# Patient Record
Sex: MALE
[De-identification: names, ages, dates, MRNs, and addresses within clinical notes are randomized per-mention and may not be internally consistent; named-entity substitution may affect disease eponyms.]

---

## 2019-09-17 ENCOUNTER — HOSPITAL ENCOUNTER (OUTPATIENT)
Dept: HOSPITAL 92 - SDC | Age: 79
Discharge: HOME | End: 2019-09-17
Attending: INTERNAL MEDICINE
Payer: MEDICARE

## 2019-09-17 VITALS — BODY MASS INDEX: 31.1 KG/M2

## 2019-09-17 DIAGNOSIS — Z88.8: ICD-10-CM

## 2019-09-17 DIAGNOSIS — G47.30: ICD-10-CM

## 2019-09-17 DIAGNOSIS — K44.9: ICD-10-CM

## 2019-09-17 DIAGNOSIS — Z79.899: ICD-10-CM

## 2019-09-17 DIAGNOSIS — F32.9: ICD-10-CM

## 2019-09-17 DIAGNOSIS — K22.2: Primary | ICD-10-CM

## 2019-09-17 DIAGNOSIS — K21.9: ICD-10-CM

## 2019-09-17 PROCEDURE — 0D758ZZ DILATION OF ESOPHAGUS, VIA NATURAL OR ARTIFICIAL OPENING ENDOSCOPIC: ICD-10-PCS | Performed by: INTERNAL MEDICINE

## 2019-09-17 NOTE — OP
DATE OF PROCEDURE:  09/17/2019



ASSISTANT SURGEON:  None.



PROCEDURE PERFORMED:  Esophagogastroduodenoscopy with esophageal dilation over

guidewire. 



INDICATION:  

1. Dysphagia.

2. History of Schatzki ring, status post dilation in the past.



MEDICATIONS:  See Anesthesia record.



FINDINGS:  After discussion of the risks, benefits, and alternatives of the

procedure, informed consent was obtained and witnessed.  Pre-endoscopic

cardiopulmonary examination was satisfactory.  Time-out was performed before

sedation was achieved.  Sedation was achieved with Anesthesia assistance in the

endoscopy unit.  The patient was placed in the left lateral decubitus position.  A

Pentax adult upper endoscope was placed into the oropharynx and passed through the

cricopharyngeus under direct visualization.  There is a small inlet patch in the

upper esophagus, but no surrounding inflammation or ulceration.  The endoscope was

advanced down the rest of the esophagus.  The esophageal mucosa appears normal

throughout.  It is a tortuous esophagus.  At the GE junction, there is a shallow

esophageal ring.  The endoscope passes easily beyond this area into the stomach.

Forward and retroflexed views of the entire gastric mucosa were obtained.  The

patient does have a 6 cm sliding hiatal hernia.  No evidence of any Sheldon erosions

or ulcerations.  The gastric mucosa appears normal throughout.  The endoscope was

passed through the pylorus and into the first and second portions of the duodenum,

which appeared normal.  At this point, a Savary spring tipped guidewire was passed

through the accessory port and the tip placed in the gastric antrum.  The endoscope

was removed leaving the guidewire in place.  I then performed a single pass with a

Savary dilator over the guidewire to 18 mm.  The dilator passed easily.  The dilator

and guidewire were then removed, and the esophagus was then reintubated with the

endoscope for re-examination.  There is good mucosal disruption at the GE junction

at the level of the Schatzki ring.  Estimated blood loss is minimal.  The upper

endoscope was then completely withdrawn, and the patient allowed to recover.  The

patient tolerated the procedure well.  There were no immediate postprocedure

complications. 



IMPRESSION:  

1. Small inlet patch.

2. Presbyesophagus.

3. Shallow esophageal ring at the GE junction, dilated to 18 mm with good mucosal

disruption. 

4. 6 cm hiatal hernia.

5. Otherwise normal esophagogastroduodenoscopy.



RECOMMENDATIONS:  

1. Advance diet as tolerated.

2. Follow up in the GI clinic in about 1 month.







Job ID:  [id number]

## 2022-06-07 ENCOUNTER — HOSPITAL ENCOUNTER (OUTPATIENT)
Dept: HOSPITAL 92 - SLEEPLAB | Age: 82
Discharge: HOME | End: 2022-06-07
Attending: INTERNAL MEDICINE
Payer: MEDICARE

## 2022-06-07 DIAGNOSIS — E66.9: ICD-10-CM

## 2022-06-07 DIAGNOSIS — G47.10: ICD-10-CM

## 2022-06-07 DIAGNOSIS — G47.33: Primary | ICD-10-CM

## 2022-06-07 DIAGNOSIS — R06.83: ICD-10-CM

## 2022-06-07 DIAGNOSIS — G47.61: ICD-10-CM

## 2022-06-07 DIAGNOSIS — F31.9: ICD-10-CM

## 2022-06-07 PROCEDURE — 95811 POLYSOM 6/>YRS CPAP 4/> PARM: CPT
